# Patient Record
Sex: FEMALE | Race: WHITE | NOT HISPANIC OR LATINO | URBAN - METROPOLITAN AREA
[De-identification: names, ages, dates, MRNs, and addresses within clinical notes are randomized per-mention and may not be internally consistent; named-entity substitution may affect disease eponyms.]

---

## 2024-04-24 PROBLEM — N89.8 VAGINAL DISCHARGE: Status: ACTIVE | Noted: 2024-04-24

## 2024-04-24 PROBLEM — J30.9 ALLERGIC RHINITIS DUE TO ALLERGEN: Status: ACTIVE | Noted: 2024-04-24

## 2024-04-24 PROBLEM — N39.0 UTI (URINARY TRACT INFECTION): Status: ACTIVE | Noted: 2024-04-24

## 2024-06-17 ENCOUNTER — OFFICE VISIT (OUTPATIENT)
Dept: HEMATOLOGY/ONCOLOGY | Facility: CLINIC | Age: 29
End: 2024-06-17
Payer: COMMERCIAL

## 2024-06-17 VITALS
RESPIRATION RATE: 16 BRPM | WEIGHT: 226.85 LBS | BODY MASS INDEX: 36.46 KG/M2 | HEIGHT: 66 IN | OXYGEN SATURATION: 98 % | TEMPERATURE: 97.2 F | SYSTOLIC BLOOD PRESSURE: 113 MMHG | HEART RATE: 68 BPM | DIASTOLIC BLOOD PRESSURE: 78 MMHG

## 2024-06-17 DIAGNOSIS — D68.59 THROMBOPHILIA (MULTI): Primary | ICD-10-CM

## 2024-06-17 DIAGNOSIS — D64.9 ANEMIA, UNSPECIFIED TYPE: ICD-10-CM

## 2024-06-17 LAB
ALBUMIN SERPL BCP-MCNC: 4.1 G/DL (ref 3.4–5)
ALP SERPL-CCNC: 34 U/L (ref 33–110)
ALT SERPL W P-5'-P-CCNC: 15 U/L (ref 7–45)
ANION GAP SERPL CALC-SCNC: 8 MMOL/L (ref 10–20)
AST SERPL W P-5'-P-CCNC: 14 U/L (ref 9–39)
BASOPHILS # BLD AUTO: 0.05 X10*3/UL (ref 0–0.1)
BASOPHILS NFR BLD AUTO: 0.8 %
BILIRUB SERPL-MCNC: 0.5 MG/DL (ref 0–1.2)
BUN SERPL-MCNC: 11 MG/DL (ref 6–23)
CALCIUM SERPL-MCNC: 9.1 MG/DL (ref 8.6–10.3)
CHLORIDE SERPL-SCNC: 107 MMOL/L (ref 98–107)
CO2 SERPL-SCNC: 26 MMOL/L (ref 21–32)
CREAT SERPL-MCNC: 0.75 MG/DL (ref 0.5–1.05)
EGFRCR SERPLBLD CKD-EPI 2021: >90 ML/MIN/1.73M*2
EOSINOPHIL # BLD AUTO: 0.1 X10*3/UL (ref 0–0.7)
EOSINOPHIL NFR BLD AUTO: 1.7 %
ERYTHROCYTE [DISTWIDTH] IN BLOOD BY AUTOMATED COUNT: 12.3 % (ref 11.5–14.5)
FERRITIN SERPL-MCNC: 58 NG/ML (ref 8–150)
FOLATE SERPL-MCNC: 17.4 NG/ML
GLUCOSE SERPL-MCNC: 90 MG/DL (ref 74–99)
HCT VFR BLD AUTO: 35.1 % (ref 36–46)
HGB BLD-MCNC: 11.6 G/DL (ref 12–16)
IMM GRANULOCYTES # BLD AUTO: 0.01 X10*3/UL (ref 0–0.7)
IMM GRANULOCYTES NFR BLD AUTO: 0.2 % (ref 0–0.9)
IRON SATN MFR SERPL: 22 % (ref 25–45)
IRON SERPL-MCNC: 69 UG/DL (ref 35–150)
LYMPHOCYTES # BLD AUTO: 2.15 X10*3/UL (ref 1.2–4.8)
LYMPHOCYTES NFR BLD AUTO: 36 %
MCH RBC QN AUTO: 28.8 PG (ref 26–34)
MCHC RBC AUTO-ENTMCNC: 33 G/DL (ref 32–36)
MCV RBC AUTO: 87 FL (ref 80–100)
MONOCYTES # BLD AUTO: 0.45 X10*3/UL (ref 0.1–1)
MONOCYTES NFR BLD AUTO: 7.5 %
NEUTROPHILS # BLD AUTO: 3.21 X10*3/UL (ref 1.2–7.7)
NEUTROPHILS NFR BLD AUTO: 53.8 %
PLATELET # BLD AUTO: 287 X10*3/UL (ref 150–450)
POTASSIUM SERPL-SCNC: 4 MMOL/L (ref 3.5–5.3)
PROT SERPL-MCNC: 7.6 G/DL (ref 6.4–8.2)
RBC # BLD AUTO: 4.03 X10*6/UL (ref 4–5.2)
SODIUM SERPL-SCNC: 137 MMOL/L (ref 136–145)
TIBC SERPL-MCNC: 317 UG/DL (ref 240–445)
UIBC SERPL-MCNC: 248 UG/DL (ref 110–370)
VIT B12 SERPL-MCNC: 376 PG/ML (ref 211–911)
WBC # BLD AUTO: 6 X10*3/UL (ref 4.4–11.3)

## 2024-06-17 PROCEDURE — 86146 BETA-2 GLYCOPROTEIN ANTIBODY: CPT | Mod: PORLAB | Performed by: NURSE PRACTITIONER

## 2024-06-17 PROCEDURE — 81240 F2 GENE: CPT | Performed by: NURSE PRACTITIONER

## 2024-06-17 PROCEDURE — 86147 CARDIOLIPIN ANTIBODY EA IG: CPT | Mod: PORLAB | Performed by: NURSE PRACTITIONER

## 2024-06-17 PROCEDURE — 82728 ASSAY OF FERRITIN: CPT | Performed by: NURSE PRACTITIONER

## 2024-06-17 PROCEDURE — 99204 OFFICE O/P NEW MOD 45 MIN: CPT | Performed by: NURSE PRACTITIONER

## 2024-06-17 PROCEDURE — 82746 ASSAY OF FOLIC ACID SERUM: CPT | Performed by: NURSE PRACTITIONER

## 2024-06-17 PROCEDURE — 80053 COMPREHEN METABOLIC PANEL: CPT | Performed by: NURSE PRACTITIONER

## 2024-06-17 PROCEDURE — 83540 ASSAY OF IRON: CPT | Performed by: NURSE PRACTITIONER

## 2024-06-17 PROCEDURE — 36415 COLL VENOUS BLD VENIPUNCTURE: CPT | Performed by: NURSE PRACTITIONER

## 2024-06-17 PROCEDURE — 85025 COMPLETE CBC W/AUTO DIFF WBC: CPT | Performed by: NURSE PRACTITIONER

## 2024-06-17 PROCEDURE — 82607 VITAMIN B-12: CPT | Performed by: NURSE PRACTITIONER

## 2024-06-17 PROCEDURE — 99214 OFFICE O/P EST MOD 30 MIN: CPT | Performed by: NURSE PRACTITIONER

## 2024-06-17 RX ORDER — LEVOTHYROXINE SODIUM 50 UG/1
1 TABLET ORAL
COMMUNITY
Start: 2024-06-12

## 2024-06-17 ASSESSMENT — PAIN SCALES - GENERAL: PAINLEVEL: 0-NO PAIN

## 2024-06-17 NOTE — PROGRESS NOTES
Patient ID: Cadence Martinez is a 28 y.o. female.  Referring Physician: No referring provider defined for this encounter.  Primary Care Provider: Silvino Robledo DO  Visit Type: Initial Visit      Subjective    Location:  [facility] [place]  Initial consult: 6/17/2024  Reason: Homozygous FVL anticipating fertility treament, never clotted    Patient is a 29 yo with homozygosity for Factor V Leiden referred to benign hematology for consultation by Dr Denson at the [facility] in [place]. She has never had a blood clot, never taken OCP, never been pregnant and is currently seeking fertility intervention and would like recommendations for anticoagulation during upcoming fertility treatment and hopefully subsequent pregnancy. She is currently taking ASA 81 mg po daily per direction of her PCP.     Today, patient presents for initial consultation, accompanied by her mother. She states she is feeling well.  Denies fatigue, chills, sob, GI symptoms, abnormal weight loss, night sweats, fever. Denies any abnormal bleeding or bruising. No recurrent infections or lymphadenopathy. No joint/body pain.  Periods are regular. Never had blood/blood products. No family hematology history other that significant thrombosis. Denies NSAID use.     Strong family thrombosis history:  Maternal grandfather who is 83, heterozygous for FVL and never clotted.   Mother - heterozygous for FVL and had 1 unprovoked DVT and then bilateral PE with COVID and complicated pnuemonia. On Eliquis 5mg po bid  Father - heterozygous for FVL w/1 unprovoked DVT, on Xarelto 20mg po daily  Brother- homozygous (?) for FVL. Mother not sure if homo or hetero, however likely homozygous due to clotting hx. Has had lower extremity DVT 3x at ages 15, 17, and 20.  On Xarelto 20mg po daily.    Review of Systems   All other systems reviewed and are negative.     Objective   BSA: 2.19 meters squared  /78   Pulse 68   Temp 36.2 °C (97.2 °F)   Resp 16   Ht (S) 1.677 m  "(5' 6.02\")   Wt 103 kg (226 lb 13.7 oz)   SpO2 98%   BMI 36.59 kg/m²     Past Medical history: As above and allergic rhinitis  Past Surgical history: negative  Family History: thrombosis history as noted above, maternal grandmother had breast cancer. No other hematological history noted.   Social History: , lives with her , works in a bank, never pregnant. Denies smoking, & no recreational drugs. Very little ETOH, a few times per year.    Physical Exam  HENT:      Head: Normocephalic.      Nose: Nose normal.      Mouth/Throat:      Mouth: Mucous membranes are moist.   Eyes:      Pupils: Pupils are equal, round, and reactive to light.   Cardiovascular:      Rate and Rhythm: Normal rate and regular rhythm.      Pulses: Normal pulses.      Heart sounds: Normal heart sounds.   Pulmonary:      Effort: Pulmonary effort is normal.      Breath sounds: Normal breath sounds.   Abdominal:      General: Bowel sounds are normal.      Palpations: Abdomen is soft.   Musculoskeletal:         General: Normal range of motion.   Skin:     General: Skin is warm and dry.   Neurological:      General: No focal deficit present.      Mental Status: She is alert and oriented to person, place, and time.   Psychiatric:         Mood and Affect: Mood normal.         Behavior: Behavior normal.     Assessment/Plan    Initial consultation for 29 yo with homozygosity for Factor V Leiden referred to benign hematology for consultation by Dr Denson at the [facility] in [place]. She has never had a blood clot, never taken OCP, never been pregnant and is currently seeking fertility intervention and would like recommendations for anticoagulation during fertility treatment and hopefully subsequent pregnancy. She is currently taking ASA 81 mg po daily per direction of her PCP.     We discussed that thrombosis occurs as the result of cumulative risk factors, both intrinsic and extrinsic, and duration of anticoagulation is determined " by the severity of the clot, whether provoked or unprovoked by external cause such as a period of immobility, or genetic mutations, protein C and S deficiency, or APAS syndrome. Although this patient is homozygous for the Factor V Leiden Mutation, she has never had a blood clot. We discussed that I would like to complete her thrombophilia workup to make sure she has no other issue that would increase her risk. She is not a smoker, we did talk about sedentary lifestyle, obesity, periods of immobility, and surgery as being additional risk factors for thrombosis.     Due to the complexity of this patient of this decision, I would like to refer this patient to see my colleague Dr Wayne Hunt at [facility] to review her thrombophilia work up and for recommendations regarding anticoagulation during fertility treatment & subsequent pregnancy. We did discuss that she would most likely need lovenox prophylaxis if she should become pregnant and that hormone containing birth control is contra-indicated. Will defer to Dr Hunt for final recommendations prior to starting fertility treatment.     PLAN:  Labs today: CBC/diff, CMP, APAS labs and prothrombin gene mutation  *I noted mild anemia and added on iron studies, B12 and folic acid. She is mildly iron deficient and I contacted her by phone to start oral iron supplementation for this. Vitron C - 1 po daily on an empty stomach with no calcium products, milk, or antacids. May use OTC stool softener for constipation prn.   2. Will repeat CBC and iron studies in 3 months to evaluate iron replacement and anemia  3. Continue ASA 81mg po as directed by her PCP for now  4. Refer to Dr Hunt for recommendation regarding anticoagulation.    I had an extensive discussion with the patient regarding the diagnosis and discussed the plan of therapy, including general considerations regarding side effects and outcomes. Pt understood and gave appropriate teach back about the plan of care.  All questions were answered to the patient's satisfaction. The patient is instructed to contact us at any time if questions or problems arise. Thank you for the opportunity to participate in the care of this very pleasant patient.      Rosanne M Casal, SANTA-CNP, DNP

## 2024-06-17 NOTE — PATIENT INSTRUCTIONS
NPV with [facility] for Homozygous Factor V Leiden mutation    Thrombophilia panel workup today    Cadence will follow up with dr. Maradiaga at [facility]. Keiry will call for an appointment for you

## 2024-06-18 ENCOUNTER — TELEPHONE (OUTPATIENT)
Dept: HEMATOLOGY/ONCOLOGY | Facility: HOSPITAL | Age: 29
End: 2024-06-18
Payer: COMMERCIAL

## 2024-06-18 LAB
B2 GLYCOPROT1 IGA SER-ACNC: <0.6 U/ML
B2 GLYCOPROT1 IGG SER-ACNC: <1.4 U/ML
B2 GLYCOPROT1 IGM SER-ACNC: 1.5 U/ML
CARDIOLIPIN IGA SERPL-ACNC: <0.5 APL U/ML
CARDIOLIPIN IGG SER IA-ACNC: <1.6 GPL U/ML
CARDIOLIPIN IGM SER IA-ACNC: 2.1 MPL U/ML

## 2024-06-18 NOTE — TELEPHONE ENCOUNTER
RN talked to patient. Patient aware of new appointment with Dr. Hunt on 6/26 at 11am at  [facility]. Patient voiced understanding. Call back number reviewed.

## 2024-06-21 LAB
ELECTRONICALLY SIGNED BY: NORMAL
FACTOR II-PROTHROMBIN INTERPRETATION: NORMAL
FACTOR II-PROTHROMBIN RESULT: NORMAL

## 2024-06-26 ENCOUNTER — OFFICE VISIT (OUTPATIENT)
Dept: HEMATOLOGY/ONCOLOGY | Facility: CLINIC | Age: 29
End: 2024-06-26
Payer: COMMERCIAL

## 2024-06-26 VITALS
SYSTOLIC BLOOD PRESSURE: 111 MMHG | HEART RATE: 79 BPM | WEIGHT: 227.4 LBS | TEMPERATURE: 98.6 F | OXYGEN SATURATION: 99 % | RESPIRATION RATE: 17 BRPM | BODY MASS INDEX: 36.68 KG/M2 | DIASTOLIC BLOOD PRESSURE: 72 MMHG

## 2024-06-26 DIAGNOSIS — Z82.49 FAMILY HISTORY OF THROMBOEMBOLIC DISEASE: ICD-10-CM

## 2024-06-26 DIAGNOSIS — D68.51 HOMOZYGOUS FACTOR V LEIDEN MUTATION (MULTI): Primary | ICD-10-CM

## 2024-06-26 PROCEDURE — 99214 OFFICE O/P EST MOD 30 MIN: CPT | Performed by: STUDENT IN AN ORGANIZED HEALTH CARE EDUCATION/TRAINING PROGRAM

## 2024-06-26 ASSESSMENT — PAIN SCALES - GENERAL: PAINLEVEL: 0-NO PAIN

## 2024-06-26 NOTE — PROGRESS NOTES
Patient ID: Cadence Martinez is a 28 y.o. female.  Referring Physician: No referring provider defined for this encounter.  Primary Care Provider: Silvino Robledo DO  Visit Type: Initial Visit  Diagnosis: Homozygous FVL       Subjective    HPI  28 y.o. female with a PMH significant for homozygosity for Factor V Leiden.     Pt was referred to benign hematology for consultation by Dr Ciera Denson at the [facility] in [place]. She has never had a blood clot, never taken OCP, never been pregnant and is currently seeking fertility intervention and would like recommendations for anticoagulation during upcoming fertility treatment and hopefully subsequent pregnancy. She is currently taking ASA 81 mg po daily per direction of her PCP.      Pt has never been pregnant. Currently on clomiphene has completed 1 round of IUI which was unsuccessful but may plan for IVF. Has not discussed using estrogen at some point yet.   Age appropriate cancer screening: no cancer, up to date on pap.   FMH: Maternal grandfather who is 83, ?heterozygous for FVL and never had VTE but has had 3 strokes   Mother - ?heterozygous for FVL and had 1 unprovoked DVT and then bilateral PE with COVID and complicated pnuemonia. On Eliquis 5mg po bid  Father - ?heterozygous for FVL w/1 unprovoked DVT, on Xarelto 20mg po daily  Brother- homozygous (?) for FVL. Mother not sure if homo or hetero, however likely homozygous due to clotting hx. Has had lower extremity DVT 3x at ages 15, 17, and 20.  On Xarelto 20mg po daily.  Social history: never smoker, no ETOH, no RDU. No CT/RT.    Review of Systems - Oncology  10 point review of systems negative except as stated in HPI    Objective   Past Surgical History:   Procedure Laterality Date    OTHER SURGICAL HISTORY  11/20/2019    No history of surgery     Oncology History    No history exists.       BSA: 2.19 meters squared /72 (BP Location: Left arm, Patient Position: Sitting, BP Cuff Size: Adult long)    Pulse 79   Temp 37 °C (98.6 °F) (Temporal)   Resp 17   Wt 103 kg (227 lb 6.5 oz)   SpO2 99%   BMI 36.68 kg/m²   Physical Exam    Assessment/Plan    28 y.o. female with a PMH significant for homozygosity for Factor V Leiden.     # FVL homozygous in pregnancy: results of testing confirmed. Pt has a strong FMH/o thrombosis but no personal h/o thrombosis. Additionally she also is planning for possible IVF which may need estrogen and other hormonal therapy. She is currently trying IUI taking intermittent clomiphene.   Plan:  - will need prophylactic anticoagulation in keeping with PUNEET guidelines. Will likely need to be dose adjusted for weight.   - follow up next: pending the progression of pregnancy   - labs prior to follow up: none       Wayne Hunt MD

## 2024-06-30 PROBLEM — Z82.49 FAMILY HISTORY OF THROMBOEMBOLIC DISEASE: Status: ACTIVE | Noted: 2024-06-30

## 2024-06-30 PROBLEM — D68.51 HOMOZYGOUS FACTOR V LEIDEN MUTATION (MULTI): Status: ACTIVE | Noted: 2024-06-30
